# Patient Record
Sex: FEMALE | Race: AMERICAN INDIAN OR ALASKA NATIVE
[De-identification: names, ages, dates, MRNs, and addresses within clinical notes are randomized per-mention and may not be internally consistent; named-entity substitution may affect disease eponyms.]

---

## 2018-02-22 ENCOUNTER — HOSPITAL ENCOUNTER (EMERGENCY)
Dept: HOSPITAL 5 - ED | Age: 23
LOS: 1 days | Discharge: LEFT BEFORE DISCHARGE | End: 2018-02-23
Payer: SELF-PAY

## 2018-02-22 VITALS — DIASTOLIC BLOOD PRESSURE: 92 MMHG | SYSTOLIC BLOOD PRESSURE: 123 MMHG

## 2018-02-22 DIAGNOSIS — R10.9: Primary | ICD-10-CM

## 2018-02-22 DIAGNOSIS — Z53.21: ICD-10-CM

## 2018-08-16 ENCOUNTER — HOSPITAL ENCOUNTER (EMERGENCY)
Dept: HOSPITAL 5 - ED | Age: 23
LOS: 1 days | Discharge: HOME | End: 2018-08-17
Payer: SELF-PAY

## 2018-08-16 DIAGNOSIS — Z88.0: ICD-10-CM

## 2018-08-16 DIAGNOSIS — F17.200: ICD-10-CM

## 2018-08-16 DIAGNOSIS — Z20.2: Primary | ICD-10-CM

## 2018-08-16 PROCEDURE — 96372 THER/PROPH/DIAG INJ SC/IM: CPT

## 2018-08-16 PROCEDURE — 81001 URINALYSIS AUTO W/SCOPE: CPT

## 2018-08-16 PROCEDURE — 87591 N.GONORRHOEAE DNA AMP PROB: CPT

## 2018-08-16 PROCEDURE — 87210 SMEAR WET MOUNT SALINE/INK: CPT

## 2018-08-16 PROCEDURE — 87086 URINE CULTURE/COLONY COUNT: CPT

## 2018-08-16 PROCEDURE — 99284 EMERGENCY DEPT VISIT MOD MDM: CPT

## 2018-08-17 VITALS — DIASTOLIC BLOOD PRESSURE: 62 MMHG | SYSTOLIC BLOOD PRESSURE: 118 MMHG

## 2018-08-17 LAB
BILIRUB UR QL STRIP: (no result)
BLOOD UR QL VISUAL: (no result)
MUCOUS THREADS #/AREA URNS HPF: (no result) /HPF
PH UR STRIP: 7 [PH] (ref 5–7)
PROT UR STRIP-MCNC: (no result) MG/DL
RBC #/AREA URNS HPF: 3 /HPF (ref 0–6)
UROBILINOGEN UR-MCNC: 2 MG/DL (ref ?–2)
WBC #/AREA URNS HPF: 2 /HPF (ref 0–6)

## 2018-08-17 NOTE — EMERGENCY DEPARTMENT REPORT
ED Female  HPI





- General


Chief complaint: Urogenital-Female


Stated complaint: PELVIC PAIN/DISCHARGE


Time Seen by Provider: 18 04:52


Source: patient


Mode of arrival: Ambulatory


Limitations: No Limitations





- History of Present Illness


Initial comments: 


22-year-old  female who presents for vaginal discharge yellow 

green with pelvic pain one week is no fevers no chills no nausea vomiting no 

back pain patient and friend having unprotected sex 1 week ago





MD Complaint: vaginal discharge, dysuria, pelvic pain, possible STD


Onset/Timin


-: week(s)


Severity: moderate


Severity scale (0 -10): 4


Quality: burning


Consistency: intermittent


Improves with: none


Worsens with: urination


Are you Pregnant Now?: No


Last Menstrual Period: 18


EDC: 19


Associated Symptoms: vaginal discharge, dysuria





- Related Data


Sexually active: Yes


: 2


Para: 2


A: 0


 Previous Rx's











 Medication  Instructions  Recorded  Last Taken  Type


 


Fluconazole [Diflucan TAB] 150 mg PO ONCE #1 tablet 18 Unknown Rx











 Allergies











Allergy/AdvReac Type Severity Reaction Status Date / Time


 


Penicillins Allergy  Hives Verified 18 22:37














ED Review of Systems


ROS: 


Stated complaint: PELVIC PAIN/DISCHARGE


Other details as noted in HPI





Constitutional: denies: chills, fever


Eyes: denies: eye pain, eye discharge, vision change


ENT: denies: ear pain, throat pain


Respiratory: denies: cough, shortness of breath, wheezing


Cardiovascular: denies: chest pain, palpitations


Endocrine: no symptoms reported


Gastrointestinal: denies: abdominal pain, nausea, diarrhea


Genitourinary: urgency, dysuria, discharge


Musculoskeletal: denies: back pain, joint swelling, arthralgia


Skin: denies: rash, lesions


Neurological: denies: headache, weakness, paresthesias


Psychiatric: denies: anxiety, depression


Hematological/Lymphatic: denies: easy bleeding, easy bruising





ED Past Medical Hx





- Past Medical History


Previous Medical History?: No


Hx Hypertension: No


Hx CVA: No


Hx Diabetes: No


Hx Deep Vein Thrombosis: No


Hx Renal Disease: No


Hx Sickle Cell Disease: No


Hx Seizures: No


Hx Asthma: No


Hx HIV: No





- Surgical History


Past Surgical History?: No





- Social History


Smoking Status: Current Every Day Smoker


Substance Use Type: Alcohol





- Medications


Home Medications: 


 Home Medications











 Medication  Instructions  Recorded  Confirmed  Last Taken  Type


 


Fluconazole [Diflucan TAB] 150 mg PO ONCE #1 tablet 18  Unknown Rx














ED Physical Exam





- General


Limitations: No Limitations


General appearance: alert, in no apparent distress





- Head


Head exam: Present: atraumatic, normocephalic





- Eye


Eye exam: Present: normal appearance





- ENT


ENT exam: Present: mucous membranes moist





- Neck


Neck exam: Present: normal inspection





- Respiratory


Respiratory exam: Present: normal lung sounds bilaterally.  Absent: respiratory 

distress





- Cardiovascular


Cardiovascular Exam: Present: regular rate, normal rhythm.  Absent: systolic 

murmur, diastolic murmur, rubs, gallop





- GI/Abdominal


GI/Abdominal exam: Present: soft, normal bowel sounds.  Absent: tenderness, 

rebound, bruit, hernia





- Expanded GI/Abdominal Exam


  ** Expanded


GI/Abdominal exam: Absent: psoas sign, obturator sign, heel tap sign, 

tenderness at [place]





- Rectal


Rectal exam: Present: deferred





- 


External exam: Present: normal external exam


Speculum exam: Present: erythema, vaginal discharge (yellow green malodorous), 

cervical discharge.  Absent: vaginal bleeding, foreign body, tissue, laceration


Bi-manual exam: Present: cervical motion tendernes (  the)





- Extremities Exam


Extremities exam: Present: normal inspection, full ROM, normal capillary 

refill.  Absent: tenderness, pedal edema, joint swelling, calf tenderness





- Back Exam


Back exam: Present: normal inspection, full ROM, muscle spasm.  Absent: 

tenderness, CVA tenderness (R), CVA tenderness (L), paraspinal tenderness, 

vertebral tenderness, rash noted





- Neurological Exam


Neurological exam: Present: alert, oriented X3, CN II-XII intact, normal gait, 

reflexes normal.  Absent: motor sensory deficit





- Psychiatric


Psychiatric exam: Present: normal affect, normal mood





- Skin


Skin exam: Present: warm, dry, intact, normal color.  Absent: rash





ED Course


 Vital Signs











  18





  23:54


 


Temperature 98.2 F


 


Pulse Rate 81


 


Respiratory 12





Rate 


 


Blood Pressure 130/92


 


O2 Sat by Pulse 100





Oximetry 














ED Medical Decision Making





- Medical Decision Making


Patient treated for STD gonorrhea Chlamydia cultures are pending vaginal exam 

consistent with possible ST pos CMT , yellow-green vaginal discharge or 

malodorous plan patient will follow with GYN in 2-3 days we'll call if cultures 

are positive for GC or chlamydia patient will report to [facility] for HIV and  HSV screening patient verbalizes understanding and 

agreement was signed be DC'd home stable condition at this time





Critical care attestation.: 


If time is entered above; I have spent that time in minutes in the direct care 

of this critically ill patient, excluding procedure time.








ED Disposition


Clinical Impression: 


 STD exposure





Disposition: DC-01 TO HOME OR SELFCARE


Is pt being admited?: No


Does the pt Need Aspirin: No


Condition: Good


Instructions:  Sexually Transmitted Diseases (ED)


Prescriptions: 


Fluconazole [Diflucan TAB] 150 mg PO ONCE #1 tablet


Referrals: 


PRIMARY CARE,MD [Primary Care Provider] - 3-5 Days


Forms:  Work/School Release Form(ED)


Time of Disposition: 06:59